# Patient Record
Sex: MALE
[De-identification: names, ages, dates, MRNs, and addresses within clinical notes are randomized per-mention and may not be internally consistent; named-entity substitution may affect disease eponyms.]

---

## 2019-04-03 ENCOUNTER — HOSPITAL ENCOUNTER (EMERGENCY)
Dept: HOSPITAL 42 - ED | Age: 29
Discharge: HOME | End: 2019-04-03
Payer: COMMERCIAL

## 2019-04-03 VITALS — HEART RATE: 79 BPM | RESPIRATION RATE: 19 BRPM | TEMPERATURE: 98 F | OXYGEN SATURATION: 99 %

## 2019-04-03 VITALS — DIASTOLIC BLOOD PRESSURE: 85 MMHG | SYSTOLIC BLOOD PRESSURE: 137 MMHG

## 2019-04-03 VITALS — BODY MASS INDEX: 30.4 KG/M2

## 2019-04-03 DIAGNOSIS — J02.9: Primary | ICD-10-CM

## 2019-04-03 DIAGNOSIS — F17.210: ICD-10-CM

## 2019-04-03 NOTE — ED PDOC
Arrival/HPI





- General


Chief Complaint: ENT Problem


Time Seen by Provider: 04/03/19 17:30


Historian: Patient





- History of Present Illness


Narrative History of Present Illness (Text): 





04/03/19 18:24


28 M with no significant pmh presents with cc of sore throat w/ difficulty 

swallowing x2weeks. Patient report visiting an urgent care until in [place], [place] 

3days ago with similar complaint which yielded a negative result for rapid str

ep. Offered to re-swab patient for strep but patient declined. While at bedside,

patient called the urgent care he visited to confirm a negative strep cuuture 

result. Patient denies any fevers, chills, joint pain, rash, headache, 

dizziness, chest pain, shortness of breath, dyspnea on exertion, cough, 

diaphoresis, abdominal pain, nausea, vomiting, diarrhea, back pain, neck pain, 

or any other complaint.  Patient states he was advised on symptomatic care i.e. 

salt water gargles which he has not been doing.











Time/Duration: < month


Symptom Onset: Gradual


Symptom Course: Unchanged


Activities at Onset: Light


Context: Home





Past Medical History





- Provider Review


Nursing Documentation Reviewed: Yes





- Infectious Disease


Hx of Infectious Diseases: None





- Psychiatric


Hx Substance Use: No





Family/Social History





- Physician Review


Nursing Documentation Reviewed: Yes


Family/Social History: Unknown Family HX


Smoking Status: Heavy Smoker > 10 Cigarettes Daily


Hx Alcohol Use: No


Hx Substance Use: No





Allergies/Home Meds


Allergies/Adverse Reactions: 


Allergies





No Known Allergies Allergy (Verified 04/03/19 17:24)


   








Home Medications: 


                                    Home Meds











 Medication  Instructions  Recorded  Confirmed


 


No Known Home Med  04/03/19 04/03/19














Review of Systems





- Physician Review


All systems were reviewed & negative as marked: Yes





- Review of Systems


Constitutional: Normal.  absent: Fevers


Eyes: Normal


ENT: Sore Throat


Respiratory: Normal.  absent: Cough


Cardiovascular: Normal


Gastrointestinal: Normal


Genitourinary Male: Normal


Musculoskeletal: Normal.  absent: Arthralgias, Joint Swelling


Skin: Normal.  absent: Rash


Neurological: Normal


Endocrine: Normal


Hemo/Lymphatic: Normal


Psychiatric: Normal





Physical Exam


Vital Signs Reviewed: Yes





Vital Signs











  Temp Pulse Resp BP Pulse Ox


 


 04/03/19 18:08  98 F  79  19   99


 


 04/03/19 17:25  97.6 F  99 H  18  137/85  96











Temperature: Afebrile


Blood Pressure: Normal


Pulse: Tachycardic


Respiratory Rate: Normal


Appearance: Positive for: Well-Appearing, Non-Toxic, Comfortable


Pain Distress: Mild


Mental Status: Positive for: Alert and Oriented X 3





- Systems Exam


Head: Present: Atraumatic, Normocephalic


Pupils: Present: PERRL


Extroacular Muscles: Present: EOMI


Conjunctiva: Present: Normal


Mouth: Present: Moist Mucous Membranes


Pharnyx: Present: ERYTHEMA (Mild erythema to posterior oralpharnyx).  No: 

EXUDATE


Neck: Present: Normal Range of Motion


Respiratory/Chest: Present: Clear to Auscultation, Good Air Exchange.  No: 

Respiratory Distress, Accessory Muscle Use


Cardiovascular: Present: Regular Rate and Rhythm, Normal S1, S2.  No: Murmurs


Abdomen: No: Tenderness, Distention, Peritoneal Signs


Back: Present: Normal Inspection


Upper Extremity: Present: Normal Inspection.  No: Cyanosis, Edema


Lower Extremity: Present: Normal Inspection.  No: Edema


Neurological: Present: GCS=15, CN II-XII Intact, Speech Normal


Skin: Present: Warm, Dry, Normal Color.  No: Rashes


Lymphatic: Present: Other (Left submandibular mobile lymph node)


Psychiatric: Present: Alert, Oriented x 3, Normal Insight, Normal Concentration





Medical Decision Making


ED Course and Treatment: 





04/03/19 18:20


Impression:


28 M presents with cc of sore throat w/ difficulty swallowing x2weeks





Plan:





-- Reassess and disposition





Prior Visits:


Notes and results from previous visits were reviewed. 





Progress Notes:

















- Scribe Statement


The provider has reviewed the documentation as recorded by the Scribe Dalyngs Duvelsaint





All medical record entries made by the Erichibomar were at my direction and 

personally dictated by me. I have reviewed the chart and agree that the record 

accurately reflects my personal performance of the history, physical exam, 

medical decision making, and the department course for this patient. I have also

personally directed, reviewed, and agree with the discharge instructions and 

disposition.











Disposition/Present on Arrival





- Present on Arrival


Any Indicators Present on Arrival: No


History of DVT/PE: No


History of Uncontrolled Diabetes: No


Urinary Catheter: No


History of Decub. Ulcer: No


History Surgical Site Infection Following: None





- Disposition


Have Diagnosis and Disposition been Completed?: Yes


Diagnosis: 


 Pharyngitis





Disposition: HOME/ ROUTINE


Disposition Time: 17:45


Patient Plan: Discharge


Condition: GOOD


Discharge Instructions (ExitCare):  Viral Pharyngitis  (DC)


Additional Instructions: 





KELSEY LANG, thank you for letting us take care of you today. Your provider 

was Ale Russell MD and you were treated for THROAT PROBLEM. The emergency 

medical care you received today was directed at your acute symptoms. If you were

prescribed any medication, please fill it and take as directed. It may take sev

eral days for your symptoms to resolve. Return to the Emergency Department if 

your symptoms worsen, do not improve, or if you have any other problems.





Please contact your doctor or call one of the physicians/clinics you have been 

referred to that are listed on the Patient Visit Information form that is 

included in your discharge packet. Bring any paperwork you were given at 

discharge with you along with any medications you are taking to your follow up 

visit. Our treatment cannot replace ongoing medical care by a primary care 

provider outside of the emergency department.





Thank you for allowing the [facility] team to be part of your care today.





Referrals: 


Gregor Jaimes, DO [Staff Provider] - Follow up with primary


Forms:  [employer] (English)